# Patient Record
Sex: MALE | Race: WHITE | NOT HISPANIC OR LATINO | Employment: OTHER | URBAN - METROPOLITAN AREA
[De-identification: names, ages, dates, MRNs, and addresses within clinical notes are randomized per-mention and may not be internally consistent; named-entity substitution may affect disease eponyms.]

---

## 2024-02-14 ENCOUNTER — LAB (OUTPATIENT)
Dept: LAB | Facility: LAB | Age: 74
End: 2024-02-14
Payer: MEDICARE

## 2024-02-14 ENCOUNTER — OFFICE VISIT (OUTPATIENT)
Dept: PRIMARY CARE | Facility: CLINIC | Age: 74
End: 2024-02-14
Payer: MEDICARE

## 2024-02-14 VITALS
DIASTOLIC BLOOD PRESSURE: 82 MMHG | HEIGHT: 72 IN | HEART RATE: 81 BPM | BODY MASS INDEX: 27.74 KG/M2 | WEIGHT: 204.8 LBS | SYSTOLIC BLOOD PRESSURE: 142 MMHG | OXYGEN SATURATION: 96 %

## 2024-02-14 DIAGNOSIS — R73.9 ELEVATED BLOOD SUGAR: ICD-10-CM

## 2024-02-14 DIAGNOSIS — Z00.00 ROUTINE GENERAL MEDICAL EXAMINATION AT HEALTH CARE FACILITY: Primary | ICD-10-CM

## 2024-02-14 DIAGNOSIS — Z00.00 ROUTINE GENERAL MEDICAL EXAMINATION AT HEALTH CARE FACILITY: ICD-10-CM

## 2024-02-14 DIAGNOSIS — Z12.5 PROSTATE CANCER SCREENING: ICD-10-CM

## 2024-02-14 DIAGNOSIS — R03.0 ELEVATED BLOOD PRESSURE READING: ICD-10-CM

## 2024-02-14 DIAGNOSIS — Z80.0 FAMILY HISTORY OF COLON CANCER: ICD-10-CM

## 2024-02-14 PROBLEM — H93.11 TINNITUS OF RIGHT EAR: Status: ACTIVE | Noted: 2024-02-14

## 2024-02-14 LAB
ALBUMIN SERPL BCP-MCNC: 4.1 G/DL (ref 3.4–5)
ALP SERPL-CCNC: 79 U/L (ref 33–136)
ALT SERPL W P-5'-P-CCNC: 22 U/L (ref 10–52)
ANION GAP SERPL CALC-SCNC: 10 MMOL/L (ref 10–20)
AST SERPL W P-5'-P-CCNC: 17 U/L (ref 9–39)
BILIRUB SERPL-MCNC: 0.6 MG/DL (ref 0–1.2)
BUN SERPL-MCNC: 15 MG/DL (ref 6–23)
CALCIUM SERPL-MCNC: 9.7 MG/DL (ref 8.6–10.3)
CHLORIDE SERPL-SCNC: 103 MMOL/L (ref 98–107)
CHOLEST SERPL-MCNC: 203 MG/DL (ref 0–199)
CHOLESTEROL/HDL RATIO: 4.3
CO2 SERPL-SCNC: 28 MMOL/L (ref 21–32)
CREAT SERPL-MCNC: 1.02 MG/DL (ref 0.5–1.3)
EGFRCR SERPLBLD CKD-EPI 2021: 78 ML/MIN/1.73M*2
EST. AVERAGE GLUCOSE BLD GHB EST-MCNC: 117 MG/DL
GLUCOSE SERPL-MCNC: 100 MG/DL (ref 74–99)
HBA1C MFR BLD: 5.7 %
HDLC SERPL-MCNC: 46.7 MG/DL
LDLC SERPL CALC-MCNC: 127 MG/DL
NON HDL CHOLESTEROL: 156 MG/DL (ref 0–149)
POTASSIUM SERPL-SCNC: 4.4 MMOL/L (ref 3.5–5.3)
PROT SERPL-MCNC: 7.6 G/DL (ref 6.4–8.2)
PSA SERPL-MCNC: 2.79 NG/ML
SODIUM SERPL-SCNC: 137 MMOL/L (ref 136–145)
TRIGL SERPL-MCNC: 147 MG/DL (ref 0–149)
VLDL: 29 MG/DL (ref 0–40)

## 2024-02-14 PROCEDURE — G0103 PSA SCREENING: HCPCS

## 2024-02-14 PROCEDURE — 36415 COLL VENOUS BLD VENIPUNCTURE: CPT

## 2024-02-14 PROCEDURE — G0439 PPPS, SUBSEQ VISIT: HCPCS | Performed by: FAMILY MEDICINE

## 2024-02-14 PROCEDURE — 80053 COMPREHEN METABOLIC PANEL: CPT

## 2024-02-14 PROCEDURE — 80061 LIPID PANEL: CPT

## 2024-02-14 PROCEDURE — 1170F FXNL STATUS ASSESSED: CPT | Performed by: FAMILY MEDICINE

## 2024-02-14 PROCEDURE — 1126F AMNT PAIN NOTED NONE PRSNT: CPT | Performed by: FAMILY MEDICINE

## 2024-02-14 PROCEDURE — 1036F TOBACCO NON-USER: CPT | Performed by: FAMILY MEDICINE

## 2024-02-14 PROCEDURE — 83036 HEMOGLOBIN GLYCOSYLATED A1C: CPT

## 2024-02-14 PROCEDURE — 1159F MED LIST DOCD IN RCRD: CPT | Performed by: FAMILY MEDICINE

## 2024-02-14 PROCEDURE — 99215 OFFICE O/P EST HI 40 MIN: CPT | Performed by: FAMILY MEDICINE

## 2024-02-14 RX ORDER — ALBUTEROL SULFATE 90 UG/1
2 AEROSOL, METERED RESPIRATORY (INHALATION) EVERY 4 HOURS PRN
COMMUNITY
Start: 2014-01-20 | End: 2024-02-14 | Stop reason: ALTCHOICE

## 2024-02-14 RX ORDER — BENZONATATE 100 MG/1
100 CAPSULE ORAL EVERY 8 HOURS PRN
COMMUNITY
Start: 2014-01-20 | End: 2024-02-14 | Stop reason: ALTCHOICE

## 2024-02-14 RX ORDER — OMEGA-3-ACID ETHYL ESTERS 1 G/1
1 CAPSULE, LIQUID FILLED ORAL 2 TIMES DAILY
COMMUNITY
End: 2024-02-14 | Stop reason: WASHOUT

## 2024-02-14 RX ORDER — VIT A/VIT C/VIT E/ZINC/COPPER 4296-226
CAPSULE ORAL
COMMUNITY

## 2024-02-14 ASSESSMENT — ENCOUNTER SYMPTOMS
ARTHRALGIAS: 0
OCCASIONAL FEELINGS OF UNSTEADINESS: 0
DIZZINESS: 0
LOSS OF SENSATION IN FEET: 0
COUGH: 0
BACK PAIN: 0
BLOOD IN STOOL: 0
NERVOUS/ANXIOUS: 0
DIARRHEA: 0
DYSPHORIC MOOD: 0
CONSTIPATION: 0
DEPRESSION: 0
PALPITATIONS: 0
CHEST TIGHTNESS: 0
HEADACHES: 0
ACTIVITY CHANGE: 0
SHORTNESS OF BREATH: 0

## 2024-02-14 ASSESSMENT — ACTIVITIES OF DAILY LIVING (ADL)
DRESSING: INDEPENDENT
BATHING: INDEPENDENT
MANAGING_FINANCES: INDEPENDENT
DOING_HOUSEWORK: INDEPENDENT
GROCERY_SHOPPING: INDEPENDENT
TAKING_MEDICATION: INDEPENDENT

## 2024-02-14 ASSESSMENT — PATIENT HEALTH QUESTIONNAIRE - PHQ9
1. LITTLE INTEREST OR PLEASURE IN DOING THINGS: NOT AT ALL
2. FEELING DOWN, DEPRESSED OR HOPELESS: NOT AT ALL
SUM OF ALL RESPONSES TO PHQ9 QUESTIONS 1 AND 2: 0

## 2024-02-14 ASSESSMENT — PAIN SCALES - GENERAL: PAINLEVEL: 0-NO PAIN

## 2024-02-14 NOTE — PROGRESS NOTES
"Subjective   Reason for Visit: Kee Shelley is an 73 y.o. male here for a Medicare Wellness visit.     Past Medical, Surgical, and Family History reviewed and updated in chart.    Reviewed all medications by prescribing practitioner or clinical pharmacist (such as prescriptions, OTCs, herbal therapies and supplements) and documented in the medical record.    HPI  Presents for his wellness exam.  He states he feels very well.  He is working out regularly at the gym.  He is squatting sometimes 400 pounds.  He states he is diet rich in whole foods.  Sleep schedule is consistent but he is usually up around 8:00.    He does not check his blood pressures at home his systolics tend to run in the 130s his diastolics in the 70s.  He uses a wrist monitor for that.    Patient Care Team:  Kieran Villela MD as PCP - General     Review of Systems   Constitutional:  Negative for activity change.   HENT:  Negative for congestion.    Respiratory:  Negative for cough, chest tightness and shortness of breath.    Cardiovascular:  Negative for chest pain, palpitations and leg swelling.   Gastrointestinal:  Negative for blood in stool, constipation and diarrhea.        Last colonoscopy was with Dr. Brown, he feels about 3 to 4 years ago.   Musculoskeletal:  Negative for arthralgias and back pain.   Neurological:  Negative for dizziness and headaches.   Psychiatric/Behavioral:  Negative for dysphoric mood. The patient is not nervous/anxious.        Objective   Vitals:  /82   Pulse 81   Ht 1.816 m (5' 11.5\")   Wt 92.9 kg (204 lb 12.8 oz)   SpO2 96%   BMI 28.17 kg/m²       Physical Exam  Vitals reviewed.   Constitutional:       Appearance: Normal appearance.   HENT:      Right Ear: Tympanic membrane, ear canal and external ear normal.      Left Ear: Tympanic membrane, ear canal and external ear normal.      Nose: Nose normal.      Mouth/Throat:      Mouth: Mucous membranes are moist.      Pharynx: Oropharynx is clear. "   Eyes:      Conjunctiva/sclera: Conjunctivae normal.   Neck:      Thyroid: No thyromegaly or thyroid tenderness.      Vascular: No carotid bruit.   Cardiovascular:      Rate and Rhythm: Normal rate and regular rhythm.      Heart sounds: No murmur heard.  Pulmonary:      Effort: Pulmonary effort is normal.      Breath sounds: Normal breath sounds.   Abdominal:      General: Abdomen is flat.      Palpations: Abdomen is soft. There is no mass.      Tenderness: There is no abdominal tenderness.   Musculoskeletal:      Cervical back: Neck supple.      Right lower leg: No edema.      Left lower leg: No edema.   Lymphadenopathy:      Cervical: No cervical adenopathy.   Skin:     General: Skin is warm and dry.   Neurological:      Mental Status: He is alert.   Psychiatric:         Mood and Affect: Mood normal.         Assessment/Plan   Problem List Items Addressed This Visit    None  Visit Diagnoses       Routine general medical examination at health care facility    -  Primary    Relevant Orders    Comprehensive Metabolic Panel    Lipid Panel    Family history of colon cancer        Prostate cancer screening        Relevant Orders    Prostate Specific Antigen    Elevated blood sugar        Relevant Orders    Hemoglobin A1C    Elevated blood pressure reading            He will continue to follow his blood pressures at home and let me know if they are consistently above 140/90.  He will continue his regular workout routine.  Continue with diet rich in whole foods.  Will check his above lab tests.  He will check with Dr. Brown to see when his next colonoscopy is due.  He should be every 5 years since he does have a family history of colon cancer in his sister.

## 2024-02-14 NOTE — PATIENT INSTRUCTIONS
Doing well.  Check your blood test fasting.  Continue you regular workouts.   Continue to follow your resting BP at home and let me know if consistently above 140/90.   Check with Dr. Brown to see when you're due for the colonoscopy.

## 2024-09-27 ENCOUNTER — OFFICE VISIT (OUTPATIENT)
Dept: PRIMARY CARE | Facility: CLINIC | Age: 74
End: 2024-09-27
Payer: MEDICARE

## 2024-09-27 VITALS
BODY MASS INDEX: 27.84 KG/M2 | WEIGHT: 202.4 LBS | HEART RATE: 68 BPM | SYSTOLIC BLOOD PRESSURE: 142 MMHG | DIASTOLIC BLOOD PRESSURE: 82 MMHG

## 2024-09-27 DIAGNOSIS — K14.8 TONGUE LESION: Primary | ICD-10-CM

## 2024-09-27 DIAGNOSIS — R03.0 ELEVATED BLOOD PRESSURE READING: ICD-10-CM

## 2024-09-27 DIAGNOSIS — Z86.010 HISTORY OF COLONIC POLYPS: ICD-10-CM

## 2024-09-27 PROCEDURE — 1036F TOBACCO NON-USER: CPT | Performed by: FAMILY MEDICINE

## 2024-09-27 PROCEDURE — 1159F MED LIST DOCD IN RCRD: CPT | Performed by: FAMILY MEDICINE

## 2024-09-27 PROCEDURE — 99214 OFFICE O/P EST MOD 30 MIN: CPT | Performed by: FAMILY MEDICINE

## 2024-09-27 PROCEDURE — 1126F AMNT PAIN NOTED NONE PRSNT: CPT | Performed by: FAMILY MEDICINE

## 2024-09-27 PROCEDURE — 1124F ACP DISCUSS-NO DSCNMKR DOCD: CPT | Performed by: FAMILY MEDICINE

## 2024-09-27 ASSESSMENT — PAIN SCALES - GENERAL: PAINLEVEL: 0-NO PAIN

## 2024-09-27 NOTE — PROGRESS NOTES
Subjective   Patient ID: Kee Shelley is a 74 y.o. male who presents for Side of Tongue lump and Tinnitus.    HPI   He presents today because he has a lump/lesion on the side of his tongue.  States is been there for about 6 months.  Has had a pain on that really.  Sometimes when he brushes his teeth it can bleed.  He is very concerned about this.      States his blood pressures at home have been around 129/73.  He takes that regularly.    Does have  history of colon polyps and thinks he is due for colonoscopy with Dr. Brown.    Review of Systems    Objective   /82   Pulse 68   Wt 91.8 kg (202 lb 6.4 oz)   BMI 27.84 kg/m²     Physical Exam  Constitutional:       Appearance: Normal appearance. He is not ill-appearing.   HENT:      Mouth/Throat:      Comments: The left side of his tongue he has an approximately half to 1 cm indurated round area, color is the same as the tongue.  Pulmonary:      Effort: Pulmonary effort is normal.   Neurological:      Mental Status: He is alert.         Assessment/Plan   Diagnoses and all orders for this visit:  Tongue lesion  -     Referral to ENT; Future  History of colonic polyps  -     Referral to Gastroenterology; Future  Elevated blood pressure reading  Blood pressure is up a little today but he certainly worried about this tongue lesion.  He has been normal at home.  He will call if it is consistently above 140/90.      I have referred him over to Dr. Mcqueen, whom he has seen before.  He has any trouble getting into him in the next week or 2, he will let me know.  I like him to be seen a soon as possible since he has so much anxiety over this.    I put in a referral for the colonoscopy.

## 2024-10-29 ENCOUNTER — APPOINTMENT (OUTPATIENT)
Dept: OTOLARYNGOLOGY | Facility: CLINIC | Age: 74
End: 2024-10-29
Payer: COMMERCIAL

## 2024-10-29 VITALS — TEMPERATURE: 97.1 F | WEIGHT: 202 LBS | BODY MASS INDEX: 27.36 KG/M2 | HEIGHT: 72 IN

## 2024-10-29 DIAGNOSIS — K14.8 TONGUE LESION: Primary | ICD-10-CM

## 2024-10-29 DIAGNOSIS — D36.9 PAPILLOMA: ICD-10-CM

## 2024-10-29 PROCEDURE — 88305 TISSUE EXAM BY PATHOLOGIST: CPT | Performed by: PATHOLOGY

## 2024-10-29 PROCEDURE — 1036F TOBACCO NON-USER: CPT | Performed by: OTOLARYNGOLOGY

## 2024-10-29 PROCEDURE — 88305 TISSUE EXAM BY PATHOLOGIST: CPT

## 2024-10-29 PROCEDURE — 99213 OFFICE O/P EST LOW 20 MIN: CPT | Performed by: OTOLARYNGOLOGY

## 2024-10-29 PROCEDURE — 3008F BODY MASS INDEX DOCD: CPT | Performed by: OTOLARYNGOLOGY

## 2024-10-29 PROCEDURE — 1159F MED LIST DOCD IN RCRD: CPT | Performed by: OTOLARYNGOLOGY

## 2024-10-29 PROCEDURE — 41100 BIOPSY OF TONGUE: CPT | Performed by: OTOLARYNGOLOGY

## 2024-10-29 PROCEDURE — 1160F RVW MEDS BY RX/DR IN RCRD: CPT | Performed by: OTOLARYNGOLOGY

## 2024-11-01 ENCOUNTER — TELEPHONE (OUTPATIENT)
Dept: OTOLARYNGOLOGY | Facility: CLINIC | Age: 74
End: 2024-11-01
Payer: COMMERCIAL

## 2024-11-01 LAB
LABORATORY COMMENT REPORT: NORMAL
PATH REPORT.FINAL DX SPEC: NORMAL
PATH REPORT.GROSS SPEC: NORMAL
PATH REPORT.RELEVANT HX SPEC: NORMAL
PATH REPORT.TOTAL CANCER: NORMAL

## 2024-11-12 ENCOUNTER — APPOINTMENT (OUTPATIENT)
Dept: OTOLARYNGOLOGY | Facility: CLINIC | Age: 74
End: 2024-11-12
Payer: MEDICARE

## 2024-11-12 VITALS — TEMPERATURE: 97.3 F | BODY MASS INDEX: 28.31 KG/M2 | HEIGHT: 72 IN | WEIGHT: 209 LBS

## 2024-11-12 DIAGNOSIS — C02.3 MALIGNANT NEOPLASM OF ANTERIOR TWO-THIRDS OF TONGUE (MULTI): Primary | ICD-10-CM

## 2024-11-12 PROCEDURE — 1036F TOBACCO NON-USER: CPT | Performed by: OTOLARYNGOLOGY

## 2024-11-12 PROCEDURE — 1159F MED LIST DOCD IN RCRD: CPT | Performed by: OTOLARYNGOLOGY

## 2024-11-12 PROCEDURE — 1160F RVW MEDS BY RX/DR IN RCRD: CPT | Performed by: OTOLARYNGOLOGY

## 2024-11-12 PROCEDURE — 99213 OFFICE O/P EST LOW 20 MIN: CPT | Performed by: OTOLARYNGOLOGY

## 2024-11-12 PROCEDURE — 3008F BODY MASS INDEX DOCD: CPT | Performed by: OTOLARYNGOLOGY

## 2024-11-12 NOTE — PROGRESS NOTES
"HPI  Kee Shelley is a 74 y.o. male follow-up pathology left tongue.  Biopsy consistent with invasive squamous cell carcinoma.  No new symptoms    History reviewed. No pertinent past medical history.         Medications:     Current Outpatient Medications:     multivit-min/iron/folic acid/K (ADULTS MULTIVITAMIN ORAL), as directed Orally, Disp: , Rfl:     vitamins A,C,E-zinc-copper (PreserVision AREDS) 4,296 mcg-226 mg-90 mg capsule, , Disp: , Rfl:      Allergies:  Allergies   Allergen Reactions    Tetanus And Diphther. Tox (Pf) Rash        Physical Exam:  Last Recorded Vitals  Temperature 36.3 °C (97.3 °F), height 1.816 m (5' 11.5\"), weight 94.8 kg (209 lb).  General:     General appearance: Well-developed, well-nourished in no acute distress.       Voice:  normal       Head/face: Normal appearance; nontender to palpation     Facial nerve function: Normal and symmetric bilaterally.    Oral/oropharynx:     Oral vestibule: Normal labial and gingival mucosa     Tongue/floor of mouth: Normal right.  Left lateral tongue actually looks better than previous.  Healing.  Still with a small ulcer      Oropharynx: Clear.  No lesions present of the hard/soft palate, posterior pharynx    Neck:     Neck: Normal appearance, trachea midline     Salivary glands: Normal to palpation bilaterally     Lymph nodes: No cervical lymphadenopathy to palpation     Thyroid: No thyromegaly.  No palpable nodules     Range of motion: Normal    Neurological:     Cortical functions: Alert and oriented x3, appropriate affect       Larynx/hypopharynx:     Laryngeal findings: Mirror exam inadequate or limited secondary to enlarged base of tongue and/or excessive gagging    Ear:     Ear canal: Normal bilaterally     Tympanic membrane: Intact and mobile bilaterally     Pinna: Normal bilaterally     Hearing:  Gross hearing assessment normal by voice    Nose:     Visualized using: Anterior rhinoscopy     Nasopharynx: Inadequate mirror exam secondary to " gag, anatomy.       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  We discussed the pathology results and I have referred him to my head neck surgical colleagues to discuss treatment with him.  I will see him back at any point        Colby Mcqueen MD

## 2024-11-25 ENCOUNTER — OFFICE VISIT (OUTPATIENT)
Dept: OTOLARYNGOLOGY | Facility: CLINIC | Age: 74
End: 2024-11-25
Payer: MEDICARE

## 2024-11-25 VITALS
BODY MASS INDEX: 27.8 KG/M2 | DIASTOLIC BLOOD PRESSURE: 94 MMHG | RESPIRATION RATE: 16 BRPM | WEIGHT: 205.25 LBS | TEMPERATURE: 98.3 F | HEIGHT: 72 IN | OXYGEN SATURATION: 96 % | HEART RATE: 79 BPM | SYSTOLIC BLOOD PRESSURE: 149 MMHG

## 2024-11-25 DIAGNOSIS — K14.8 TONGUE LESION: ICD-10-CM

## 2024-11-25 DIAGNOSIS — C02.9 PRIMARY TONGUE SQUAMOUS CELL CARCINOMA (MULTI): Primary | ICD-10-CM

## 2024-11-25 PROCEDURE — 99214 OFFICE O/P EST MOD 30 MIN: CPT | Performed by: OTOLARYNGOLOGY

## 2024-11-25 PROCEDURE — 1126F AMNT PAIN NOTED NONE PRSNT: CPT | Performed by: OTOLARYNGOLOGY

## 2024-11-25 PROCEDURE — 1159F MED LIST DOCD IN RCRD: CPT | Performed by: OTOLARYNGOLOGY

## 2024-11-25 PROCEDURE — 99214 OFFICE O/P EST MOD 30 MIN: CPT | Mod: GC | Performed by: OTOLARYNGOLOGY

## 2024-11-25 PROCEDURE — 3008F BODY MASS INDEX DOCD: CPT | Performed by: OTOLARYNGOLOGY

## 2024-11-25 PROCEDURE — 1160F RVW MEDS BY RX/DR IN RCRD: CPT | Performed by: OTOLARYNGOLOGY

## 2024-11-25 PROCEDURE — 1036F TOBACCO NON-USER: CPT | Performed by: OTOLARYNGOLOGY

## 2024-11-25 SDOH — ECONOMIC STABILITY: FOOD INSECURITY: WITHIN THE PAST 12 MONTHS, THE FOOD YOU BOUGHT JUST DIDN'T LAST AND YOU DIDN'T HAVE MONEY TO GET MORE.: NEVER TRUE

## 2024-11-25 SDOH — ECONOMIC STABILITY: FOOD INSECURITY: WITHIN THE PAST 12 MONTHS, YOU WORRIED THAT YOUR FOOD WOULD RUN OUT BEFORE YOU GOT MONEY TO BUY MORE.: NEVER TRUE

## 2024-11-25 ASSESSMENT — PATIENT HEALTH QUESTIONNAIRE - PHQ9
SUM OF ALL RESPONSES TO PHQ9 QUESTIONS 1 AND 2: 0
1. LITTLE INTEREST OR PLEASURE IN DOING THINGS: NOT AT ALL
2. FEELING DOWN, DEPRESSED OR HOPELESS: NOT AT ALL

## 2024-11-25 ASSESSMENT — LIFESTYLE VARIABLES
HOW MANY STANDARD DRINKS CONTAINING ALCOHOL DO YOU HAVE ON A TYPICAL DAY: 1 OR 2
HOW OFTEN DO YOU HAVE SIX OR MORE DRINKS ON ONE OCCASION: NEVER
SKIP TO QUESTIONS 9-10: 1
AUDIT-C TOTAL SCORE: 1
HOW OFTEN DO YOU HAVE A DRINK CONTAINING ALCOHOL: MONTHLY OR LESS

## 2024-11-25 ASSESSMENT — PAIN SCALES - GENERAL: PAINLEVEL_OUTOF10: 0-NO PAIN

## 2024-11-25 ASSESSMENT — ENCOUNTER SYMPTOMS
DEPRESSION: 0
LOSS OF SENSATION IN FEET: 0
OCCASIONAL FEELINGS OF UNSTEADINESS: 0

## 2024-11-25 ASSESSMENT — COLUMBIA-SUICIDE SEVERITY RATING SCALE - C-SSRS
6. HAVE YOU EVER DONE ANYTHING, STARTED TO DO ANYTHING, OR PREPARED TO DO ANYTHING TO END YOUR LIFE?: NO
1. IN THE PAST MONTH, HAVE YOU WISHED YOU WERE DEAD OR WISHED YOU COULD GO TO SLEEP AND NOT WAKE UP?: NO
2. HAVE YOU ACTUALLY HAD ANY THOUGHTS OF KILLING YOURSELF?: NO

## 2024-11-25 NOTE — PROGRESS NOTES
Otolaryngology - Head and Neck Cancer Outpatient Follow-up Note    Chief Concern:  Follow-up for Tongue lesion     History Of Present Illness  Kee Shelley is a 74 y.o. male with a history of  a left posterior-lateral tongue SCC referred by Dr. Mcqueen, presenting today for further evaluation.  Patient reports that they had left-sided tongue pain about 2 and half months ago.  This was persistent and progressive and so they presented to PCP for further evaluation.  Their PCP noticed a lesion on the lateral aspect the tongue and sent them to Dr. Mcqueen for further evaluation.  Dr. Mcqueen elevated lesion in obtain a biopsy which resulted with schedule carcinoma.  He is not have any significant difficulty with p.o. intake, breathing, bleeding, denies any new neck masses.  He has a remote history of smoking 50 years ago, for half a pack a day for 4 to 5 years.  He is an occasional drinker.  He has a family history of colorectal cancer in his sister.  No other family history of head neck malignancies.  He is retired.  He is pretty active and works out 4 to 5 days a week with cardio and weightlifting.  He is right-handed.     Previous Head and Neck Oncologic History:  Diagnosis:  Tongue lesion  Initial Staging: n/a   Treatment History:   10/29/24 - Tongue lesion bx: +SCC        Past Medical History  He has no past medical history on file.  Patient Active Problem List   Diagnosis    Tinnitus of right ear       Surgical History  He has a past surgical history that includes Carpal tunnel release.     Social History  He reports that he has quit smoking. His smoking use included cigarettes. He has never used smokeless tobacco. He reports current alcohol use. He reports that he does not use drugs.    Family History  No family history on file.     Allergies  Patient has no known allergies.    Review of Systems  A 12-point review of systems was performed and noted be negative except for that which was mentioned in the history of  present illness     Last Recorded Vitals  Blood pressure (!) 149/94, pulse 79, temperature 36.8 °C (98.3 °F), temperature source Temporal, resp. rate 16, height 1.829 m (6'), weight 93.1 kg (205 lb 4 oz), SpO2 96%.     Physical Exam:  Constitutional:  No acute distress  Voice:  No hoarseness or other abnormality  Respiration:  Breathing comfortably, no stridor  Cardiovascular:  No clubbing/cyanosis/edema in hands  Eyes:  EOM intact, sclera normal  Neuro:  Alert and oriented times 3, Cranial nerves II-XII grossly intact and symmetric bilaterally  Head and Face:  Symmetric facial features, no masses or lesions, sinuses non-tender to palpation  Salivary Glands:  Parotid and submandibular glands normal bilaterally  Right Ear:  Normal external ear, external auditory canal, and TM to otoscopy, normal hearing to whispered voice.  Left Ear: Normal external ear, external auditory canal, and TM to otoscopy, normal hearing to whispered voice.  Nose:  External nose midline, anterior rhinoscopy is normal with limited visualization to the anterior aspect of the interior turbinates, no bleeding or drainage, no lesions  Oral Cavity/Oropharynx/Lips: Left lateral tongue with 1 cm diameter firm, raised lesion with central scarring from previous biopsy, does not feel super deep on palpation, there are areas of leukoplakia appreciated extending approximately 1 cm anterior and inferior to the primary lesion, otherwise normal mucous membranes, normal floor of mouth/tongue/OP, no masses or lesions  Pharynx: no masses or lesions  Neck/Lymph:  No LAD, no thyroid masses, trachea midline  Skin:  Neck skin is without scar or injury  Psych:  Alert and oriented with appropriate mood and affect      Medications:  Medication Documentation Review Audit       Reviewed by Shivam Bauman MD (Physician) on 11/25/24 at 1422      Medication Order Taking? Sig Documenting Provider Last Dose Status   multivit-min/iron/folic acid/K (ADULTS MULTIVITAMIN ORAL)  [id number] Yes as directed Orally Historical Provider, MD Taking Active   vitamins A,C,E-zinc-copper (PreserVision AREDS) 4,296 mcg-226 mg-90 mg capsule [id number] Yes  Historical Provider, MD Taking Active                      Imaging:  None relevant to the current chief concern available       Assessment/Plan   74-year-old male with a new left-sided lateral tongue skin cell carcinoma that is approximately 1 cm in size with surrounding leukoplakia.  Tentatively clinical staging of T1 N0 M0.  Will obtain imaging of the neck and chest to complete clinical staging.  Discussed options for treatment and recommendation for surgical excision at this time.  Discussed direct laryngoscopy and left partial glossectomy.  Discussed the need for potential elective neck dissection in the future should the final pathology be greater than 3 to 4 mm versus observation at that time pending the depth of the lesion on final pathology.  Discussed the risks of surgery including bleeding, infection, recurrence, postoperative pain.  Patient expressed understanding of the nature of disease as well as the risks and would like to move forward with surgical resection.    -Obtain CT neck  -Obtain CT chest  - Plan for direct laryngoscopy with Left partial glossectomy      Key Nagy MD  [id number]  Otolaryngology - Head and Neck Surgery

## 2024-11-26 ENCOUNTER — LAB (OUTPATIENT)
Dept: LAB | Facility: LAB | Age: 74
End: 2024-11-26
Payer: MEDICARE

## 2024-11-26 DIAGNOSIS — C02.9 PRIMARY TONGUE SQUAMOUS CELL CARCINOMA (MULTI): ICD-10-CM

## 2024-11-26 LAB
BUN SERPL-MCNC: 14 MG/DL (ref 6–23)
CREAT SERPL-MCNC: 0.84 MG/DL (ref 0.5–1.3)
EGFRCR SERPLBLD CKD-EPI 2021: >90 ML/MIN/1.73M*2

## 2024-11-26 PROCEDURE — 36415 COLL VENOUS BLD VENIPUNCTURE: CPT

## 2024-12-03 ENCOUNTER — APPOINTMENT (OUTPATIENT)
Dept: PREADMISSION TESTING | Facility: HOSPITAL | Age: 74
End: 2024-12-03
Payer: COMMERCIAL

## 2024-12-05 ENCOUNTER — APPOINTMENT (OUTPATIENT)
Dept: RADIOLOGY | Facility: HOSPITAL | Age: 74
End: 2024-12-05
Payer: COMMERCIAL

## 2024-12-05 ENCOUNTER — HOSPITAL ENCOUNTER (OUTPATIENT)
Dept: RADIOLOGY | Facility: HOSPITAL | Age: 74
Discharge: HOME | End: 2024-12-05
Payer: MEDICARE

## 2024-12-05 DIAGNOSIS — C02.9 PRIMARY TONGUE SQUAMOUS CELL CARCINOMA (MULTI): ICD-10-CM

## 2024-12-05 PROCEDURE — 2550000001 HC RX 255 CONTRASTS: Performed by: OTOLARYNGOLOGY

## 2024-12-05 PROCEDURE — 70491 CT SOFT TISSUE NECK W/DYE: CPT

## 2024-12-10 ENCOUNTER — APPOINTMENT (OUTPATIENT)
Dept: PREADMISSION TESTING | Facility: HOSPITAL | Age: 74
End: 2024-12-10
Payer: COMMERCIAL

## 2024-12-11 ENCOUNTER — LAB (OUTPATIENT)
Dept: LAB | Facility: LAB | Age: 74
End: 2024-12-11
Payer: COMMERCIAL

## 2024-12-11 ENCOUNTER — PRE-ADMISSION TESTING (OUTPATIENT)
Dept: PREADMISSION TESTING | Facility: HOSPITAL | Age: 74
End: 2024-12-11
Payer: MEDICARE

## 2024-12-11 ENCOUNTER — ANESTHESIA EVENT (OUTPATIENT)
Dept: OPERATING ROOM | Facility: HOSPITAL | Age: 74
End: 2024-12-11
Payer: MEDICARE

## 2024-12-11 VITALS
OXYGEN SATURATION: 97 % | TEMPERATURE: 98.6 F | HEIGHT: 73 IN | WEIGHT: 207.3 LBS | DIASTOLIC BLOOD PRESSURE: 86 MMHG | BODY MASS INDEX: 27.47 KG/M2 | SYSTOLIC BLOOD PRESSURE: 145 MMHG | HEART RATE: 88 BPM

## 2024-12-11 DIAGNOSIS — Z01.818 PREOP TESTING: Primary | ICD-10-CM

## 2024-12-11 DIAGNOSIS — Z01.818 PREOP TESTING: ICD-10-CM

## 2024-12-11 LAB
ANION GAP SERPL CALCULATED.3IONS-SCNC: 12 MMOL/L (ref 10–20)
BASOPHILS # BLD AUTO: 0.05 X10*3/UL (ref 0–0.1)
BASOPHILS NFR BLD AUTO: 0.6 %
BUN SERPL-MCNC: 14 MG/DL (ref 6–23)
CALCIUM SERPL-MCNC: 9.4 MG/DL (ref 8.6–10.3)
CHLORIDE SERPL-SCNC: 105 MMOL/L (ref 98–107)
CO2 SERPL-SCNC: 27 MMOL/L (ref 21–32)
CREAT SERPL-MCNC: 0.85 MG/DL (ref 0.5–1.3)
EGFRCR SERPLBLD CKD-EPI 2021: >90 ML/MIN/1.73M*2
EOSINOPHIL # BLD AUTO: 0.11 X10*3/UL (ref 0–0.4)
EOSINOPHIL NFR BLD AUTO: 1.3 %
ERYTHROCYTE [DISTWIDTH] IN BLOOD BY AUTOMATED COUNT: 13.8 % (ref 11.5–14.5)
GLUCOSE SERPL-MCNC: 81 MG/DL (ref 74–99)
HCT VFR BLD AUTO: 49.2 % (ref 41–52)
HGB BLD-MCNC: 16.4 G/DL (ref 13.5–17.5)
IMM GRANULOCYTES # BLD AUTO: 0.09 X10*3/UL (ref 0–0.5)
IMM GRANULOCYTES NFR BLD AUTO: 1.1 % (ref 0–0.9)
LYMPHOCYTES # BLD AUTO: 1.96 X10*3/UL (ref 0.8–3)
LYMPHOCYTES NFR BLD AUTO: 23.2 %
MCH RBC QN AUTO: 29.8 PG (ref 26–34)
MCHC RBC AUTO-ENTMCNC: 33.3 G/DL (ref 32–36)
MCV RBC AUTO: 90 FL (ref 80–100)
MONOCYTES # BLD AUTO: 0.72 X10*3/UL (ref 0.05–0.8)
MONOCYTES NFR BLD AUTO: 8.5 %
NEUTROPHILS # BLD AUTO: 5.52 X10*3/UL (ref 1.6–5.5)
NEUTROPHILS NFR BLD AUTO: 65.3 %
NRBC BLD-RTO: 0 /100 WBCS (ref 0–0)
PLATELET # BLD AUTO: 234 X10*3/UL (ref 150–450)
POTASSIUM SERPL-SCNC: 4.1 MMOL/L (ref 3.5–5.3)
RBC # BLD AUTO: 5.5 X10*6/UL (ref 4.5–5.9)
SODIUM SERPL-SCNC: 140 MMOL/L (ref 136–145)
WBC # BLD AUTO: 8.5 X10*3/UL (ref 4.4–11.3)

## 2024-12-11 PROCEDURE — 80048 BASIC METABOLIC PNL TOTAL CA: CPT

## 2024-12-11 PROCEDURE — 85025 COMPLETE CBC W/AUTO DIFF WBC: CPT

## 2024-12-11 RX ORDER — MAGNESIUM 250 MG
TABLET ORAL DAILY
COMMUNITY

## 2024-12-11 RX ORDER — PSEUDOEPHEDRINE HCL 30 MG
TABLET ORAL DAILY
COMMUNITY

## 2024-12-11 RX ORDER — ACETAMINOPHEN 500 MG
1000 TABLET ORAL EVERY 6 HOURS PRN
COMMUNITY
End: 2024-12-12 | Stop reason: HOSPADM

## 2024-12-11 ASSESSMENT — DUKE ACTIVITY SCORE INDEX (DASI)
CAN YOU PARTICIPATE IN STRENOUS SPORTS LIKE SWIMMING, SINGLES TENNIS, FOOTBALL, BASKETBALL, OR SKIING: YES
CAN YOU CLIMB A FLIGHT OF STAIRS OR WALK UP A HILL: YES
CAN YOU WALK A BLOCK OR TWO ON LEVEL GROUND: YES
CAN YOU TAKE CARE OF YOURSELF (EAT, DRESS, BATHE, OR USE TOILET): YES
CAN YOU RUN A SHORT DISTANCE: YES
DASI METS SCORE: 9.9
CAN YOU PARTICIPATE IN MODERATE RECREATIONAL ACTIVITIES LIKE GOLF, BOWLING, DANCING, DOUBLES TENNIS OR THROWING A BASEBALL OR FOOTBALL: YES
CAN YOU DO MODERATE WORK AROUND THE HOUSE LIKE VACUUMING, SWEEPING FLOORS OR CARRYING GROCERIES: YES
CAN YOU HAVE SEXUAL RELATIONS: YES
CAN YOU WALK INDOORS, SUCH AS AROUND YOUR HOUSE: YES
CAN YOU DO LIGHT WORK AROUND THE HOUSE LIKE DUSTING OR WASHING DISHES: YES
CAN YOU DO YARD WORK LIKE RAKING LEAVES, WEEDING OR PUSHING A MOWER: YES
TOTAL_SCORE: 58.2
CAN YOU DO HEAVY WORK AROUND THE HOUSE LIKE SCRUBBING FLOORS OR LIFTING AND MOVING HEAVY FURNITURE: YES

## 2024-12-11 ASSESSMENT — ENCOUNTER SYMPTOMS
CARDIOVASCULAR NEGATIVE: 1
MUSCULOSKELETAL NEGATIVE: 1
GASTROINTESTINAL NEGATIVE: 1
PSYCHIATRIC NEGATIVE: 1
ENDOCRINE NEGATIVE: 1
EYES NEGATIVE: 1
NEUROLOGICAL NEGATIVE: 1
RESPIRATORY NEGATIVE: 1
HEMATOLOGIC/LYMPHATIC NEGATIVE: 1
CONSTITUTIONAL NEGATIVE: 1

## 2024-12-11 ASSESSMENT — PAIN - FUNCTIONAL ASSESSMENT: PAIN_FUNCTIONAL_ASSESSMENT: 0-10

## 2024-12-11 ASSESSMENT — PAIN SCALES - GENERAL: PAINLEVEL_OUTOF10: 0 - NO PAIN

## 2024-12-11 NOTE — PREPROCEDURE INSTRUCTIONS
Medication List            Accurate as of December 11, 2024  3:13 PM. Always use your most recent med list.                acetaminophen 500 mg tablet  Commonly known as: Tylenol  Medication Adjustments for Surgery: Take/Use as prescribed     ADULTS MULTIVITAMIN ORAL  Notes to patient: Stop until after surgery     calcium citrate 250 mg calcium tablet  Notes to patient: Stop until after surgery     magnesium 250 mg tablet  Notes to patient: Stop until after surgery     PreserVision AREDS 4,296 mcg-226 mg-90 mg capsule  Generic drug: vitamins A,C,E-zinc-copper  Notes to patient: Stop until after surgery     ZINC ACETATE ORAL  Notes to patient: Stop until after surgery                Preoperative Fasting Guidelines    Why must I stop eating and drinking near surgery time?  With sedation, food or liquid in your stomach can enter your lungs causing serious complications  Increases nausea and vomiting    When do I need to stop eating and drinking before my surgery?  Do not eat any food after midnight the night before your surgery/procedure.  You may have up to 13.5 ounces of clear liquid until TWO hours before your instructed arrival time to the hospital.  This includes water, black tea/coffee, (no milk or cream) apple juice, and electrolyte drinks (Gatorade)  You may chew gum until TWO hours before your surgery/procedure      PAT DISCHARGE INSTRUCTIONS    Please call the Same Day Surgery (SDS) Department of the hospital where your procedure will be performed after 2:00 PM the day before your surgery. If you are scheduled on a Monday, or a Tuesday following a Monday holiday, you will need to call on the last business day prior to your surgery.    [facility]  [address] [place] [address] [phone]  [address], [address] [phone]  [facility]  [place]  [address] [address] [place] [address]  [phone]    Please let your surgeon know if:      You develop any open sores, shingles, burning or painful urination as these may increase your risk of an infection.   You no longer wish to have the surgery.   Any other personal circumstances change that may lead to the need to cancel or defer this surgery-such as being sick or getting admitted to any hospital within one week of your planned procedure.    Your contact details change, such as a change of address or phone number.    Starting now:     Please DO NOT drink alcohol or smoke for 24 hours before surgery. It is well known that quitting smoking can make a huge difference to your health and recovery from surgery. The longer you abstain from smoking, the better your chances of a healthy recovery. If you need help with quitting, call 9-800QUIT-NOW to be connected to a trained counselor who will discuss the best methods to help you quit.     Before your surgery:    Please stop all supplements 7 days prior to surgery. Or as directed by your surgeon.   Please stop taking NSAID pain medicine such as Advil and Motrin 7 days before surgery.    If you develop any fever, cough, cold, rashes, cuts, scratches, scrapes, urinary symptoms or infection anywhere on your body (including teeth and gums) prior to surgery, please call your surgeon’s office as soon as possible. This may require treatment to reduce the chance of cancellation on the day of surgery.    The day before your surgery:   DIET- Please follow the diet instructions at the top of your packet.   Get a good night’s rest.  Use the special soap for bathing if you have been instructed to use one.    Scheduled surgery times may change and you will be notified if this occurs - please check your personal voicemail for any updates.     On the morning of surgery:   Wear comfortable, loose fitting clothes which open in the front. Please do not wear moisturizers,  creams, lotions, makeup or perfume.    Please bring with you to surgery:   Photo ID and insurance card   Current list of medicines and allergies   Pacemaker/ Defibrillator/Heart stent cards   CPAP machine and mask    Slings/ splints/ crutches   A copy of your complete advanced directive/DHPOA.    Please do NOT bring with you to surgery:   All jewelry and valuables should be left at home.   Prosthetic devices such as contact lenses, hearing aids, dentures, eyelash extensions, hairpins and body piercings must be removed prior to going in to the surgical suite.    After outpatient surgery:   A responsible adult MUST accompany you at the time of discharge and stay with you for 24 hours after your surgery. You may NOT drive yourself home after surgery.    Do not drive, operate machinery, make critical decisions or do activities that require co-ordination or balance until after a night’s sleep.   Do not drink alcoholic beverages for 24 hours.   Instructions for resuming your medications will be provided by your surgeon.    CALL YOUR DOCTOR AFTER SURGERY IF YOU HAVE:     Chills and/or a fever of 101° F or higher.    Redness, swelling, pus or drainage from your surgical wound or a bad smell from the wound.    Lightheadedness, fainting or confusion.    Persistent vomiting (throwing up) and are not able to eat or drink for 12 hours.    Three or more loose, watery bowel movements in 24 hours (diarrhea).   Difficulty or pain while urinating( after non-urological surgery)    Pain and swelling in your legs, especially if it is only on one side.    Difficulty breathing or are breathing faster than normal.    Any new concerning symptoms.

## 2024-12-11 NOTE — PROGRESS NOTES
Pharmacy Medication History Review    Kee Shelley is a 74 y.o. male who is planned to be admitted for Primary tongue squamous cell carcinoma (Multi). Pharmacy called the patient prior to their scheduled procedure and reviewed the patient's [id number] medications for accuracy.    Medications ADDED:  none  Medications CHANGED:  none  Medications REMOVED:   none    Please review updated prior to admission medication list and comments regarding how patient may be taking medications differently by going to Admission tab --> Admission Orders --> Admit Orders / Review prior to admission medications.     Preferred pharmacy, last doses of medications, and allergies to be confirmed with patient by nursing the day of procedure.     Sources used to complete the med history include:  [facility]  Pharmacy dispense history  Patient interview  Chart Review  Care Everywhere     Below are additional concerns with the patient's PTA list.  none    Francy Lucas   [facility]  Please reach out via Secure Chat for questions

## 2024-12-11 NOTE — H&P (VIEW-ONLY)
CPM/PAT Evaluation       Name: Kee Shelley (Kee Shelley)  /Age: 1950/ y.o.     In-Person       Chief Complaint: tongue lesion    HPI    Pt is  a 74 year old male with a left sided tongue lesion. Pt reports he first noticed the lesion on his tongue about three months ago. Pt stated at times the left side of his tongue feels sore.  Pt also report occasional bleeding when he brushes his toothbrush over the lesion.  Pt saw an ENT physician who performed a tongue biopsy. The biopsy showed:  Invasive keratinizing squamous cell carcinoma, moderately differentiated. Pt was given a referral to a surgeon.  Pt as completed imaging and has been  scheduled for direct laryngoscopy and left partial glossectomy. Pt denies dysphagia, CP, SOB, or dizziness.     Past Medical History:   Diagnosis Date    Anxiety     Tinnitus of right ear      Past Surgical History:   Procedure Laterality Date    CARPAL TUNNEL RELEASE Left     CATARACT EXTRACTION Bilateral     COLONOSCOPY      OTHER SURGICAL HISTORY Bilateral     bilateral  retinal tear repair     Social History     Tobacco Use    Smoking status: Former     Current packs/day: 0.00     Types: Cigarettes     Quit date: 1974     Years since quittin.0    Smokeless tobacco: Never   Substance Use Topics    Alcohol use: Yes     Comment: about once a month     Social History     Substance and Sexual Activity   Drug Use Never       No Known Allergies    Current Outpatient Medications   Medication Sig Dispense Refill    calcium citrate 250 mg calcium tablet Take by mouth once daily.      magnesium 250 mg tablet Take by mouth once daily.      multivit-min/iron/folic acid/K (ADULTS MULTIVITAMIN ORAL) Take 1 tablet by mouth once daily.      vitamins A,C,E-zinc-copper (PreserVision AREDS) 4,296 mcg-226 mg-90 mg capsule Take 1 capsule by mouth once daily.      ZINC ACETATE ORAL Use in the mouth or throat once daily.      acetaminophen (Tylenol) 500 mg tablet Take 2 tablets  (1,000 mg) by mouth every 6 hours if needed for mild pain (1 - 3).       No current facility-administered medications for this visit.   Review of Systems   Constitutional: Negative.    HENT:          Left sided tongue lesion that occasionally bleeds.   Eyes: Negative.    Respiratory: Negative.     Cardiovascular: Negative.    Gastrointestinal: Negative.    Endocrine: Negative.    Genitourinary: Negative.    Musculoskeletal: Negative.    Skin: Negative.    Neurological: Negative.    Hematological: Negative.    Psychiatric/Behavioral: Negative.       Physical Exam  Vitals reviewed.   Constitutional:       Appearance: Normal appearance.      Comments: Frequent movement and shifting on the exam chair.   HENT:      Head: Normocephalic and atraumatic.      Nose: Nose normal.      Mouth/Throat:      Mouth: Mucous membranes are moist.      Pharynx: Oropharynx is clear.      Comments: Tongue lesion noted  on left side of tongue. The lesion is the same color as the patient's tongue. No bleeding noted.  Eyes:      Extraocular Movements: Extraocular movements intact.      Conjunctiva/sclera: Conjunctivae normal.      Pupils: Pupils are equal, round, and reactive to light.   Cardiovascular:      Rate and Rhythm: Normal rate and regular rhythm.      Pulses: Normal pulses.      Heart sounds: Normal heart sounds.   Pulmonary:      Effort: Pulmonary effort is normal.      Breath sounds: Normal breath sounds.   Abdominal:      General: Bowel sounds are normal.      Palpations: Abdomen is soft.      Hernia: A hernia (small umbilical  hernia noted on exam.denies pain  with palpation) is present.   Musculoskeletal:         General: Normal range of motion.      Cervical back: Neck supple.   Skin:     General: Skin is warm and dry.   Neurological:      General: No focal deficit present.      Mental Status: He is alert and oriented to person, place, and time. Mental status is at baseline.   Psychiatric:         Mood and Affect: Mood  "normal.         Behavior: Behavior normal.         Thought Content: Thought content normal.         Judgment: Judgment normal.          PAT AIRWAY:   Airway:     Mallampati::  I    TM distance::  >3 FB    Neck ROM::  Full  normal        Visit Vitals  /86   Pulse 88   Temp 37 °C (98.6 °F) (Temporal)   Ht 1.842 m (6' 0.5\")   Wt 94 kg (207 lb 4.8 oz)   SpO2 97%   BMI 27.73 kg/m²   Smoking Status Former   BSA 2.19 m²     ASA: 2  DASI: 58.2  METS: 9.9  CHADS: 1.9%  RCRI: 0.4%  STOP BANG: 3  ARISCAT: 1.6%    Assessment and Plan:     Primary tongue squamous cell carcinoma: Direct Laryngoscopy, Left partial glossectomy.  Anxiety  Right ear tinnitus    CBC and BMP collected in PAT.    Justyna Hong APRN-CNP      "

## 2024-12-12 ENCOUNTER — HOSPITAL ENCOUNTER (INPATIENT)
Facility: HOSPITAL | Age: 74
LOS: 1 days | Discharge: HOME | End: 2024-12-12
Attending: OTOLARYNGOLOGY | Admitting: OTOLARYNGOLOGY
Payer: MEDICARE

## 2024-12-12 ENCOUNTER — ANESTHESIA (OUTPATIENT)
Dept: OPERATING ROOM | Facility: HOSPITAL | Age: 74
End: 2024-12-12
Payer: MEDICARE

## 2024-12-12 VITALS
HEIGHT: 72 IN | HEART RATE: 76 BPM | BODY MASS INDEX: 27.77 KG/M2 | OXYGEN SATURATION: 94 % | WEIGHT: 205.03 LBS | TEMPERATURE: 97.5 F | DIASTOLIC BLOOD PRESSURE: 78 MMHG | SYSTOLIC BLOOD PRESSURE: 139 MMHG | RESPIRATION RATE: 16 BRPM

## 2024-12-12 DIAGNOSIS — C02.9 PRIMARY TONGUE SQUAMOUS CELL CARCINOMA (MULTI): Primary | ICD-10-CM

## 2024-12-12 LAB
ABO GROUP (TYPE) IN BLOOD: NORMAL
ANTIBODY SCREEN: NORMAL
RH FACTOR (ANTIGEN D): NORMAL

## 2024-12-12 PROCEDURE — 7100000009 HC PHASE TWO TIME - INITIAL BASE CHARGE: Performed by: OTOLARYNGOLOGY

## 2024-12-12 PROCEDURE — 7100000010 HC PHASE TWO TIME - EACH INCREMENTAL 1 MINUTE: Performed by: OTOLARYNGOLOGY

## 2024-12-12 PROCEDURE — 0CBM8ZX EXCISION OF PHARYNX, VIA NATURAL OR ARTIFICIAL OPENING ENDOSCOPIC, DIAGNOSTIC: ICD-10-PCS | Performed by: OTOLARYNGOLOGY

## 2024-12-12 PROCEDURE — 2500000004 HC RX 250 GENERAL PHARMACY W/ HCPCS (ALT 636 FOR OP/ED)

## 2024-12-12 PROCEDURE — 2500000005 HC RX 250 GENERAL PHARMACY W/O HCPCS: Performed by: OTOLARYNGOLOGY

## 2024-12-12 PROCEDURE — 3600000009 HC OR TIME - EACH INCREMENTAL 1 MINUTE - PROCEDURE LEVEL FOUR: Performed by: OTOLARYNGOLOGY

## 2024-12-12 PROCEDURE — 2720000007 HC OR 272 NO HCPCS: Performed by: OTOLARYNGOLOGY

## 2024-12-12 PROCEDURE — 31525 DX LARYNGOSCOPY EXCL NB: CPT | Performed by: OTOLARYNGOLOGY

## 2024-12-12 PROCEDURE — 1210000001 HC SEMI-PRIVATE ROOM DAILY

## 2024-12-12 PROCEDURE — 0CB7XZZ EXCISION OF TONGUE, EXTERNAL APPROACH: ICD-10-PCS | Performed by: OTOLARYNGOLOGY

## 2024-12-12 PROCEDURE — 86901 BLOOD TYPING SEROLOGIC RH(D): CPT | Performed by: STUDENT IN AN ORGANIZED HEALTH CARE EDUCATION/TRAINING PROGRAM

## 2024-12-12 PROCEDURE — C1729 CATH, DRAINAGE: HCPCS | Performed by: OTOLARYNGOLOGY

## 2024-12-12 PROCEDURE — 7100000002 HC RECOVERY ROOM TIME - EACH INCREMENTAL 1 MINUTE: Performed by: OTOLARYNGOLOGY

## 2024-12-12 PROCEDURE — A41135: Performed by: STUDENT IN AN ORGANIZED HEALTH CARE EDUCATION/TRAINING PROGRAM

## 2024-12-12 PROCEDURE — 99100 ANES PT EXTEME AGE<1 YR&>70: CPT | Performed by: STUDENT IN AN ORGANIZED HEALTH CARE EDUCATION/TRAINING PROGRAM

## 2024-12-12 PROCEDURE — 3700000001 HC GENERAL ANESTHESIA TIME - INITIAL BASE CHARGE: Performed by: OTOLARYNGOLOGY

## 2024-12-12 PROCEDURE — 3600000004 HC OR TIME - INITIAL BASE CHARGE - PROCEDURE LEVEL FOUR: Performed by: OTOLARYNGOLOGY

## 2024-12-12 PROCEDURE — 3700000002 HC GENERAL ANESTHESIA TIME - EACH INCREMENTAL 1 MINUTE: Performed by: OTOLARYNGOLOGY

## 2024-12-12 PROCEDURE — 41135 TONGUE AND NECK SURGERY: CPT | Performed by: OTOLARYNGOLOGY

## 2024-12-12 PROCEDURE — 7100000001 HC RECOVERY ROOM TIME - INITIAL BASE CHARGE: Performed by: OTOLARYNGOLOGY

## 2024-12-12 PROCEDURE — 36415 COLL VENOUS BLD VENIPUNCTURE: CPT | Performed by: STUDENT IN AN ORGANIZED HEALTH CARE EDUCATION/TRAINING PROGRAM

## 2024-12-12 RX ORDER — TRIPROLIDINE/PSEUDOEPHEDRINE 2.5MG-60MG
400 TABLET ORAL EVERY 6 HOURS
Qty: 800 ML | Refills: 0 | Status: SHIPPED | OUTPATIENT
Start: 2024-12-12 | End: 2024-12-22

## 2024-12-12 RX ORDER — PROPOFOL 10 MG/ML
INJECTION, EMULSION INTRAVENOUS AS NEEDED
Status: DISCONTINUED | OUTPATIENT
Start: 2024-12-12 | End: 2024-12-12

## 2024-12-12 RX ORDER — OXYCODONE HYDROCHLORIDE 5 MG/1
5 TABLET ORAL EVERY 6 HOURS PRN
Qty: 20 TABLET | Refills: 0 | Status: SHIPPED | OUTPATIENT
Start: 2024-12-12

## 2024-12-12 RX ORDER — AMPICILLIN AND SULBACTAM 2; 1 G/1; G/1
INJECTION, POWDER, FOR SOLUTION INTRAMUSCULAR; INTRAVENOUS AS NEEDED
Status: DISCONTINUED | OUTPATIENT
Start: 2024-12-12 | End: 2024-12-12

## 2024-12-12 RX ORDER — ACETAMINOPHEN 325 MG/1
650 TABLET ORAL EVERY 4 HOURS PRN
Status: DISCONTINUED | OUTPATIENT
Start: 2024-12-12 | End: 2024-12-12 | Stop reason: HOSPADM

## 2024-12-12 RX ORDER — MIDAZOLAM HYDROCHLORIDE 1 MG/ML
INJECTION INTRAMUSCULAR; INTRAVENOUS AS NEEDED
Status: DISCONTINUED | OUTPATIENT
Start: 2024-12-12 | End: 2024-12-12

## 2024-12-12 RX ORDER — OXYCODONE HYDROCHLORIDE 5 MG/1
10 TABLET ORAL EVERY 4 HOURS PRN
Status: DISCONTINUED | OUTPATIENT
Start: 2024-12-12 | End: 2024-12-12 | Stop reason: HOSPADM

## 2024-12-12 RX ORDER — HYDROMORPHONE HYDROCHLORIDE 1 MG/ML
INJECTION, SOLUTION INTRAMUSCULAR; INTRAVENOUS; SUBCUTANEOUS AS NEEDED
Status: DISCONTINUED | OUTPATIENT
Start: 2024-12-12 | End: 2024-12-12

## 2024-12-12 RX ORDER — ACETAMINOPHEN 160 MG/5ML
650 LIQUID ORAL EVERY 6 HOURS
Qty: 943 ML | Refills: 1 | Status: SHIPPED | OUTPATIENT
Start: 2024-12-12 | End: 2024-12-22

## 2024-12-12 RX ORDER — LIDOCAINE HCL/PF 100 MG/5ML
SYRINGE (ML) INTRAVENOUS AS NEEDED
Status: DISCONTINUED | OUTPATIENT
Start: 2024-12-12 | End: 2024-12-12

## 2024-12-12 RX ORDER — DROPERIDOL 2.5 MG/ML
0.62 INJECTION, SOLUTION INTRAMUSCULAR; INTRAVENOUS ONCE AS NEEDED
Status: DISCONTINUED | OUTPATIENT
Start: 2024-12-12 | End: 2024-12-12 | Stop reason: HOSPADM

## 2024-12-12 RX ORDER — OXYCODONE HYDROCHLORIDE 5 MG/1
5 TABLET ORAL EVERY 4 HOURS PRN
Status: DISCONTINUED | OUTPATIENT
Start: 2024-12-12 | End: 2024-12-12 | Stop reason: HOSPADM

## 2024-12-12 RX ORDER — DEXMEDETOMIDINE IN 0.9 % NACL 20 MCG/5ML
SYRINGE (ML) INTRAVENOUS AS NEEDED
Status: DISCONTINUED | OUTPATIENT
Start: 2024-12-12 | End: 2024-12-12

## 2024-12-12 RX ORDER — ESMOLOL HYDROCHLORIDE 10 MG/ML
INJECTION INTRAVENOUS AS NEEDED
Status: DISCONTINUED | OUTPATIENT
Start: 2024-12-12 | End: 2024-12-12

## 2024-12-12 RX ORDER — HYDROMORPHONE HYDROCHLORIDE 0.2 MG/ML
0.2 INJECTION INTRAMUSCULAR; INTRAVENOUS; SUBCUTANEOUS EVERY 5 MIN PRN
Status: DISCONTINUED | OUTPATIENT
Start: 2024-12-12 | End: 2024-12-12 | Stop reason: HOSPADM

## 2024-12-12 RX ORDER — SODIUM CHLORIDE 0.9 G/100ML
IRRIGANT IRRIGATION AS NEEDED
Status: DISCONTINUED | OUTPATIENT
Start: 2024-12-12 | End: 2024-12-12 | Stop reason: HOSPADM

## 2024-12-12 RX ORDER — FENTANYL CITRATE 50 UG/ML
INJECTION, SOLUTION INTRAMUSCULAR; INTRAVENOUS AS NEEDED
Status: DISCONTINUED | OUTPATIENT
Start: 2024-12-12 | End: 2024-12-12

## 2024-12-12 RX ORDER — ROCURONIUM BROMIDE 10 MG/ML
INJECTION, SOLUTION INTRAVENOUS AS NEEDED
Status: DISCONTINUED | OUTPATIENT
Start: 2024-12-12 | End: 2024-12-12

## 2024-12-12 RX ORDER — CHLORHEXIDINE GLUCONATE ORAL RINSE 1.2 MG/ML
15 SOLUTION DENTAL
Qty: 1350 ML | Refills: 0 | Status: SHIPPED | OUTPATIENT
Start: 2024-12-12 | End: 2025-01-11

## 2024-12-12 SDOH — HEALTH STABILITY: MENTAL HEALTH: CURRENT SMOKER: 0

## 2024-12-12 ASSESSMENT — PAIN - FUNCTIONAL ASSESSMENT
PAIN_FUNCTIONAL_ASSESSMENT: 0-10

## 2024-12-12 ASSESSMENT — PAIN SCALES - GENERAL
PAINLEVEL_OUTOF10: 5 - MODERATE PAIN
PAINLEVEL_OUTOF10: 0 - NO PAIN
PAINLEVEL_OUTOF10: 2
PAINLEVEL_OUTOF10: 4
PAINLEVEL_OUTOF10: 0 - NO PAIN
PAINLEVEL_OUTOF10: 0 - NO PAIN
PAINLEVEL_OUTOF10: 7
PAIN_LEVEL: 1
PAINLEVEL_OUTOF10: 6

## 2024-12-12 ASSESSMENT — COLUMBIA-SUICIDE SEVERITY RATING SCALE - C-SSRS
1. IN THE PAST MONTH, HAVE YOU WISHED YOU WERE DEAD OR WISHED YOU COULD GO TO SLEEP AND NOT WAKE UP?: NO
2. HAVE YOU ACTUALLY HAD ANY THOUGHTS OF KILLING YOURSELF?: NO
6. HAVE YOU EVER DONE ANYTHING, STARTED TO DO ANYTHING, OR PREPARED TO DO ANYTHING TO END YOUR LIFE?: NO

## 2024-12-12 NOTE — OP NOTE
Direct Laryngoscopy, Glossectomy Partial (L) Operative Note     Date: 2024  OR Location: [facility] OR    Name: Kee Shelley, : 1950, Age: 74 y.o., MRN: [id number], Sex: male    Diagnosis  Pre-op Diagnosis      * Primary tongue squamous cell carcinoma (Multi) [C02.9] Post-op Diagnosis     * Primary tongue squamous cell carcinoma (Multi) [C02.9]     Procedures  Direct Laryngoscopy  90207 - CT LARYNGOSCOPY W/WO TRACHEOSCOPY DX EXCEPT     Glossectomy Partial  28045 - CT GLOSSECTOMY PRTL W/UNI RADICAL NECK DSJ      Surgeons      * Shivam Bauman - Primary    Resident/Fellow/Other Assistant:  Surgeons and Role:     * Karthik Dasilva DO - Resident - Assisting    Staff:   Circulator: Serafin Min Person: Adán  Circulator: Jennifer    Anesthesia Staff: Anesthesiologist: Sean King MD  Anesthesia Resident: Kerri Vicente MD    Procedure Summary  Anesthesia: General  ASA: II  Estimated Blood Loss: 5mL  Intra-op Medications:   Administrations occurring from 0655 to 0900 on 24:   Medication Name Total Dose   sodium chloride 0.9 % irrigation solution 1,000 mL   ampicillin-sulbactam (Unasyn) 3 g 3 g   dexAMETHasone (Decadron) injection 4 mg/mL 10 mg   dexMEDETOMidine 4 mcg/mL in NS syringe 8 mcg   esmolol 10 mg/mL 70 mg   fentaNYL (Sublimaze) injection 50 mcg/mL 100 mcg   HYDROmorphone (Dilaudid) injection 1 mg/mL 0.6 mg   LR bolus Cannot be calculated   lidocaine (cardiac) injection 2% prefilled syringe 100 mg   midazolam PF (Versed) injection 1 mg/mL 2 mg   propofol (Diprivan) injection 10 mg/mL 230 mg   rocuronium (ZeMuron) 50 mg/5 mL injection 50 mg   NaCl 0.9 % bolus Cannot be calculated              Anesthesia Record               Intraprocedure I/O Totals          Intake    LR bolus 250.00 mL    NaCl 0.9 % bolus 250.00 mL    Total Intake 500 mL          Specimen:   ID Type Source Tests Collected by Time   1 : VALLECULA BIOPSY Tissue TONGUE RESECTION SURGICAL PATHOLOGY EXAM Shivam Bauman MD  12/12/2024 0806   2 : LEFT PARTIAL GLOSSECTOMY, STITCH IS ANTERIOR Tissue TONGUE RESECTION SURGICAL PATHOLOGY EXAM Shivam Bauman MD 12/12/2024 0809   3 : ADDITIONAL DEEP MARGIN Tissue TONGUE RESECTION SURGICAL PATHOLOGY EXAM Shivam Bauman MD 12/12/2024 0814   4 : ADDITIONAL LEFT FLOOR OF MOUTH MARGIN, CLIP IS ANTERIOR Tissue TONGUE RESECTION SURGICAL PATHOLOGY EXAM Shivam Bauman MD 12/12/2024 0911   5 : ADDITIONAL LEFT POSTERIOR MEDIAL TONGUE MARGIN, CLIP IS ANTERIOR Tissue TONGUE RESECTION SURGICAL PATHOLOGY EXAM Shivam Bauman MD 12/12/2024 0911                 Drains and/or Catheters: * None in log *    Tourniquet Times:         Implants:     Findings:   Cystic looking lesion suspicious for mucocele in the vallecula  1 cm a 1 cm firm raised lesion on the left lateral tongue with surrounding areas of leukoplakia  Intra-op frozen shows a few margins with high grade dysplasia that was close, these areas were re-excised for permanent.  Tongue closed primarily    Indications: Kee hSelley is an 74 y.o. male who is having surgery for Primary tongue squamous cell carcinoma (Multi) [C02.9].  Previous biopsy confirmed squamous cell carcinoma.  CT imaging identified area of concern consistent with a left lateral tongue.  Recommendation was made to proceed with direct laryngoscopy and partial left colectomy..    The patient was seen in the preoperative area. The risks, benefits, complications, treatment options, non-operative alternatives, expected recovery and outcomes were discussed with the patient. The possibilities of reaction to medication, pulmonary aspiration, injury to surrounding structures, bleeding, recurrent infection, the need for additional procedures, failure to diagnose a condition, and creating a complication requiring transfusion or operation were discussed with the patient. The patient concurred with the proposed plan, giving informed consent.  The site of surgery was properly noted/marked if necessary per policy. The  patient has been actively warmed in preoperative area. Preoperative antibiotics were given prior to surgery    Procedure Details:   After identification in the preoperative holding area the patient was taken to the operating room and transferred to the operating table in supine position. After this anesthesia was induced without difficulty and a proper timeout was performed. The operating table was then rotated 90 degrees counter-clockwise and the patient was appropriately positioned.    The dedo laryngoscope was then introduced and the oropharynx, hypopharynx, soft palate, base of tongue, epiglottis, and larynx were all visualized and there was a cystic mass noted in the vallecula.  Cup forceps were used to take a biopsy and to marsupialize the mucocele. Biopsy was sent for final pathology.    Next, attention was turned to the tongue.  A blue lip retractor and tooth retractor were inserted into the patient's mouth.  A 2-0 silk suture was placed in the anterior portion of the patient's tongue and used as a suspension suture.  The previously biopsied area was identified on the left lateral tongue.  1 cm margins were marked out circumferentially around the lesion, including the additional patches of  leukoplakia laterally.  Monopolar was used to remove the specimen with with care to stay on the outer portions of the previously marked out incision lines and to take a deeper margin at the level of specimen.  The specimen was tagged with 2-0 silk and sent to pathology for frozen analysis.    Monopolar cautery was used to remove another specimen deep to the initial squamous cell carcinoma lesion.  The deep margin was sent for final pathologic analysis.    Frozen analysis confirmed no cancer at the margins.  However, there was concern for areas of high-grade dysplasia that were close on the anterior/inferior and posterior/superior portion of lesions.  The adjacent tissue was discarded and Monopolar cautery and scissors  were used to remove additional margins in these areas.    Bipolar electrocautery was used to obtain hemostasis.  3-0 Vicryl sutures in a horizontal mattress fashion were used to close the deep layes.  A 3-0 Vicryl suture in a running locked fashion was used to close superficial layer.  This concluded procedure.  The bed was rotated back towards anesthesia.  Care was turned over to anesthesia.      Dr. Bauman was present for the critical portions of the procedure.      Complications:  None; patient tolerated the procedure well.    Disposition: PACU - hemodynamically stable.  Condition: stable       Attending Attestation:     Shivam Bauman  Phone Number: [phone]

## 2024-12-12 NOTE — DISCHARGE INSTRUCTIONS
Discharge Instructions        Kee Shelley  [id number]    Physicians:   Dr. Bauman    Operations performed while hospitalized:   Direct laryngoscopy  Left partial glossectomy    Treatment/wound care:   It is okay to shower    Please use your Peridex mouthwash after meals  Please visually inspect your wound(s) at least once daily.  If the wound(s) are in a difficult to see location, please use a mirror or have someone else assist with visual inspection.    Pain Control:    Adequate management: Alternate taking either acetaminophen or ibuprofen every 3 hours as needed for pain. For example, take acetaminophen at 9:00, followed by ibuprofen at 12:00, followed by acetaminophen at 3:00, etc.   Oxycodone can be taken as prescribed as needed for breakthrough pain.    Oxycodone is a narcotic, which can induce constipation.   Please take stool softeners when taking this medication to prevent constipation.    Activity after Discharge:   No heavy lifting, weight bearing as tolerated, no driving until mobility is returned to normal.   Do not submerge wound(s) in standing water for 7 days after surgery (no tub bathing, swimming, or hot tubs).   Do not drive or operate heavy machinery while taking narcotic pain medications as these medications can alter perception, impair judgement, and slow reaction times.    Diet:   You do not have any diet restrictions.  Recommendation is to start with a soft diet and progress back to your regular diet.  Recommendation would be to avoid hot and spicy foods for at least the first week.  These can cause increased pain.    Call Provider if:  Breathing faster than normal  Breathing harder than normal or having retractions  Fever of 100.4 (38 C) or higher  Temperature is greater than 102 degrees  Chills  Drinking less than normal  Not being able to go 4-6 hours between albuterol treatments  Urinating less than normal, over 1 day  Acting very sleepy and difficult to awaken  Vomiting (throwing up) and  not able to eat or drink for 12 hours  3 or more loose, watery bowel movements in 24 hours (diarrhea)  Any new concerning symptoms

## 2024-12-12 NOTE — ANESTHESIA PROCEDURE NOTES
Peripheral IV  Date/Time: 12/12/2024 8:00 AM      Placement  Needle size: 18 G  Laterality: right  Location: hand  Local anesthetic: none  Site prep: chlorhexidine  Technique: anatomical landmarks  Attempts: 1

## 2024-12-12 NOTE — ANESTHESIA POSTPROCEDURE EVALUATION
Patient: Kee Shelley    Procedure Summary       Date: 12/12/24 Room / Location: [facility] OR 05 / [facility]    Anesthesia Start: 0726 Anesthesia Stop: 0937    Procedures:       Direct Laryngoscopy (Mouth)      Glossectomy Partial (Left: Mouth) Diagnosis:       Primary tongue squamous cell carcinoma (Multi)      (Primary tongue squamous cell carcinoma (Multi) [C02.9])    Surgeons: Shivam Bauman MD Responsible Provider: Sean King MD    Anesthesia Type: general ASA Status: 2            Anesthesia Type: general    Vitals Value Taken Time   /73 12/12/24 0940   Temp 36.5 12/12/24 0940   Pulse 70 12/12/24 0937   Resp 16 12/12/24 0937   SpO2 97 % 12/12/24 0937   Vitals shown include unfiled device data.    Anesthesia Post Evaluation    Patient location during evaluation: PACU  Patient participation: complete - patient participated  Level of consciousness: sleepy but conscious  Pain score: 1  Pain management: adequate  Airway patency: patent  Two or more strategies used to mitigate risk of obstructive sleep apnea  Cardiovascular status: acceptable and blood pressure returned to baseline  Respiratory status: acceptable, airway suctioned, face mask and spontaneous ventilation  Hydration status: acceptable  Postoperative Nausea and Vomiting: none        No notable events documented.

## 2024-12-12 NOTE — ANESTHESIA PROCEDURE NOTES
Airway  Date/Time: 12/12/2024 7:39 AM  Urgency: elective    Airway not difficult    Staffing  Performed: resident   Authorized by: Sean King MD    Performed by: Kerri Vicente MD  Patient location during procedure: OR    Indications and Patient Condition  Indications for airway management: anesthesia  Spontaneous Ventilation: absent  Sedation level: deep  Preoxygenated: yes  Patient position: sniffing  Mask difficulty assessment: 1 - vent by mask  Planned trial extubation    Final Airway Details  Final airway type: endotracheal airway      Successful airway: ETT  Cuffed: yes   Successful intubation technique: direct laryngoscopy  Facilitating devices/methods: intubating stylet  Endotracheal tube insertion site: oral  Blade: Tania  Blade size: #3  ETT size (mm): 6.0  Cormack-Lehane Classification: grade I - full view of glottis  Placement verified by: capnometry   Measured from: teeth  ETT to teeth (cm): 21  Number of attempts at approach: 2  Ventilation between attempts: none    Additional Comments  Attempt #1 by MS4 with MAC4 blade, grade III view.    Attempt #2 by PGY-2 with MAC4 blade, grade I view. Patient maintained SpO2 throughout attempts.

## 2024-12-12 NOTE — ANESTHESIA PREPROCEDURE EVALUATION
"Patient: Kee Shelley    Procedure Information       Date/Time: 12/12/24 0655    Procedures:       Direct Laryngoscopy      Glossectomy Partial (Left) - Left partial glossectomy    Location: [facility] OR 05 / [facility]    Surgeons: Shivam Bauman MD        74 yoM w/ L sided tongue lesion presenting for partial glossectomy.     ALLERGIES:  No Known Allergies     MEDICAL HISTORY:  Past Medical History:   Diagnosis Date    Anxiety     Tinnitus of right ear         Relevant Problems   Liver   (+) Primary tongue squamous cell carcinoma (Multi)        SURGICAL HISTORY:  Past Surgical History:   Procedure Laterality Date    CARPAL TUNNEL RELEASE Left     CATARACT EXTRACTION Bilateral     COLONOSCOPY      OTHER SURGICAL HISTORY Bilateral     bilateral  retinal tear repair        MEDICATIONS:  Current Outpatient Medications   Medication Instructions    acetaminophen (TYLENOL) 1,000 mg, Every 6 hours PRN    calcium citrate 250 mg calcium tablet Daily    magnesium 250 mg tablet Daily    multivit-min/iron/folic acid/K (ADULTS MULTIVITAMIN ORAL) Take 1 tablet by mouth once daily.    vitamins A,C,E-zinc-copper (PreserVision AREDS) 4,296 mcg-226 mg-90 mg capsule Take 1 capsule by mouth once daily.    ZINC ACETATE ORAL Daily        VITALS:      12/12/2024     6:17 AM 12/11/2024     2:55 PM 11/25/2024     1:52 PM   Vitals   Systolic 147 145 149   Diastolic 88 86 94   Heart Rate 79 88 79   Temp 36.1 °C (97 °F) 37 °C (98.6 °F) 36.8 °C (98.3 °F)   Resp 18  16   Height 1.829 m (6') 1.842 m (6' 0.5\") 1.829 m (6')   Weight (lb) 205.03 207.3 205.25   BMI 27.81 kg/m2 27.73 kg/m2 27.84 kg/m2   BSA (m2) 2.17 m2 2.19 m2 2.17 m2   Visit Report   Report       LABS:   BMP   Lab Results   Component Value Date    GLUCOSE 81 12/11/2024    CALCIUM 9.4 12/11/2024     12/11/2024    K 4.1 12/11/2024    CO2 27 12/11/2024     12/11/2024    BUN 14 12/11/2024    CREATININE 0.85 12/11/2024   , CBC  Lab Results   Component Value Date    " WBC 8.5 2024    HGB 16.4 2024    HCT 49.2 2024    MCV 90 2024     2024           SOCIAL:  Social History     Tobacco Use   Smoking Status Former    Current packs/day: 0.00    Types: Cigarettes    Quit date: 1974    Years since quittin.0   Smokeless Tobacco Former    Types: Chew   Tobacco Comments    Former chew on occasion      Social History     Substance and Sexual Activity   Alcohol Use Yes    Comment: about once a month      Social History     Substance and Sexual Activity   Drug Use Never        NPO STATUS:  NPO/Void Status  Carbohydrate Drink Given Prior to Surgery? : N  Date of Last Liquid: 24  Time of Last Liquid: 0  Date of Last Solid: 24  Time of Last Solid: 1700  Last Intake Type: Clear fluids        Clinical Areas Reviewed:   Tobacco  Allergies  Meds   Med Hx  Surg Hx   Fam Hx  Soc Hx        Anesthesia Assessment:    Physical Exam    Airway  Mallampati: I  TM distance: >3 FB  Neck ROM: full     Cardiovascular    Dental    Pulmonary    Abdominal            Anesthesia Plan    History of general anesthesia?: yes  History of complications of general anesthesia?: no    ASA 2     general     The patient is not a current smoker.    intravenous induction   Postoperative administration of opioids is intended.  Trial extubation is planned.  Anesthetic plan and risks discussed with patient.  Use of blood products discussed with patient who consented to blood products.    Plan discussed with resident.

## 2024-12-27 LAB
LAB AP BLOCK FOR ADDITIONAL STUDIES: NORMAL
LABORATORY COMMENT REPORT: NORMAL
Lab: NORMAL
PATH REPORT.FINAL DX SPEC: NORMAL
PATH REPORT.GROSS SPEC: NORMAL
PATH REPORT.RELEVANT HX SPEC: NORMAL
PATH REPORT.TOTAL CANCER: NORMAL
PATHOLOGY SYNOPTIC REPORT: NORMAL
RESIDENT REVIEW: NORMAL

## 2024-12-28 DIAGNOSIS — C02.3 MALIGNANT NEOPLASM OF ANTERIOR TWO-THIRDS OF TONGUE (MULTI): Primary | ICD-10-CM

## 2025-01-03 ENCOUNTER — APPOINTMENT (OUTPATIENT)
Dept: HEMATOLOGY/ONCOLOGY | Facility: HOSPITAL | Age: 75
End: 2025-01-03
Payer: COMMERCIAL

## 2025-01-13 ENCOUNTER — OFFICE VISIT (OUTPATIENT)
Dept: OTOLARYNGOLOGY | Facility: CLINIC | Age: 75
End: 2025-01-13
Payer: MEDICARE

## 2025-01-13 VITALS
RESPIRATION RATE: 16 BRPM | TEMPERATURE: 98.7 F | OXYGEN SATURATION: 99 % | BODY MASS INDEX: 28.31 KG/M2 | HEART RATE: 87 BPM | WEIGHT: 209 LBS | HEIGHT: 72 IN

## 2025-01-13 DIAGNOSIS — C02.3 MALIGNANT NEOPLASM OF ANTERIOR TWO-THIRDS OF TONGUE (MULTI): Primary | ICD-10-CM

## 2025-01-13 PROCEDURE — 1159F MED LIST DOCD IN RCRD: CPT | Performed by: OTOLARYNGOLOGY

## 2025-01-13 PROCEDURE — 99211 OFF/OP EST MAY X REQ PHY/QHP: CPT | Performed by: OTOLARYNGOLOGY

## 2025-01-13 PROCEDURE — 1036F TOBACCO NON-USER: CPT | Performed by: OTOLARYNGOLOGY

## 2025-01-13 PROCEDURE — 1126F AMNT PAIN NOTED NONE PRSNT: CPT | Performed by: OTOLARYNGOLOGY

## 2025-01-13 PROCEDURE — 3008F BODY MASS INDEX DOCD: CPT | Performed by: OTOLARYNGOLOGY

## 2025-01-13 ASSESSMENT — PATIENT HEALTH QUESTIONNAIRE - PHQ9
SUM OF ALL RESPONSES TO PHQ9 QUESTIONS 1 AND 2: 0
2. FEELING DOWN, DEPRESSED OR HOPELESS: NOT AT ALL
1. LITTLE INTEREST OR PLEASURE IN DOING THINGS: NOT AT ALL

## 2025-01-13 ASSESSMENT — COLUMBIA-SUICIDE SEVERITY RATING SCALE - C-SSRS
2. HAVE YOU ACTUALLY HAD ANY THOUGHTS OF KILLING YOURSELF?: NO
1. IN THE PAST MONTH, HAVE YOU WISHED YOU WERE DEAD OR WISHED YOU COULD GO TO SLEEP AND NOT WAKE UP?: NO
6. HAVE YOU EVER DONE ANYTHING, STARTED TO DO ANYTHING, OR PREPARED TO DO ANYTHING TO END YOUR LIFE?: NO

## 2025-01-13 ASSESSMENT — PAIN SCALES - GENERAL: PAINLEVEL_OUTOF10: 0-NO PAIN

## 2025-01-13 ASSESSMENT — ENCOUNTER SYMPTOMS: OCCASIONAL FEELINGS OF UNSTEADINESS: 0

## 2025-01-13 NOTE — PROGRESS NOTES
Otolaryngology - Head and Neck Cancer Outpatient Follow-up Note    Chief Concern:  Follow-up for Tongue lesion     History Of Present Illness  Kee Shelley is a 74 y.o. male with a history of  a left posterior-lateral tongue SCC referred by Dr. Mcqueen, presenting today for further evaluation.  Patient reports that they had left-sided tongue pain about 2 and half months ago.  This was persistent and progressive and so they presented to PCP for further evaluation.  Their PCP noticed a lesion on the lateral aspect the tongue and sent them to Dr. Mcqueen for further evaluation.  Dr. Mcqueen elevated lesion in obtain a biopsy which resulted with schedule carcinoma.  He is not have any significant difficulty with p.o. intake, breathing, bleeding, denies any new neck masses.  He has a remote history of smoking 50 years ago, for half a pack a day for 4 to 5 years.  He is an occasional drinker.  He has a family history of colorectal cancer in his sister.  No other family history of head neck malignancies.  He is retired.  He is pretty active and works out 4 to 5 days a week with cardio and weightlifting.  He is right-handed.     Previous Head and Neck Oncologic History:  Diagnosis:  Tongue lesion  Initial Staging: n/a   Treatment History:   10/29/24 - Tongue lesion bx: +SCC    1-13-25 post-op: s/p left partial glossectomy. He is doing well, no longer having any pain. No bleeding    Past Medical History  He has a past medical history of Anxiety and Tinnitus of right ear.  Patient Active Problem List   Diagnosis    Tinnitus of right ear    Primary tongue squamous cell carcinoma (Multi)       Surgical History  He has a past surgical history that includes Carpal tunnel release (Left); Cataract extraction (Bilateral); Colonoscopy; and Other surgical history (Bilateral).     Social History  He reports that he quit smoking about 50 years ago. His smoking use included cigarettes. He has quit using smokeless tobacco.  His smokeless  tobacco use included chew. He reports current alcohol use. He reports that he does not use drugs.    Family History  No family history on file.     Allergies  Patient has no known allergies.    Review of Systems  A 12-point review of systems was performed and noted be negative except for that which was mentioned in the history of present illness     Last Recorded Vitals  Pulse 87, temperature 37.1 °C (98.7 °F), temperature source Temporal, resp. rate 16, height 1.829 m (6'), weight 94.8 kg (209 lb), SpO2 99%.     Physical Exam:  NAD alert  Rebecca eomi NCAT  Oc/op: healing lateral tongue incision, still some mild swelling posteriorly, there may have been a dehiscent suture  Neck without masses or lesions      Medications:  Medication Documentation Review Audit       Reviewed by Edelmira Cadet RN (Registered Nurse) on 25 at 1357      Medication Order Taking? Sig Documenting Provider Last Dose Status   calcium citrate 250 mg calcium tablet [id number] Yes Take by mouth once daily. Madhu Marcos MD 2024 Active   chlorhexidine (Peridex) 0.12 % solution [id number]  Use 15 mL in the mouth or throat 3 times a day after meals. Shivam Bauman MD   25 2359   magnesium 250 mg tablet [id number] Yes Take by mouth once daily. Madhu Marcos MD 2024 Active   multivit-min/iron/folic acid/K (ADULTS MULTIVITAMIN ORAL) [id number] Yes Take 1 tablet by mouth once daily. Madhu Marcos MD 2024 Active   oxyCODONE (Roxicodone) 5 mg immediate release tablet [id number] Yes Take 1 tablet (5 mg) by mouth every 6 hours if needed for severe pain (7 - 10). Chauncey Monte MD  Active   vitamins A,C,E-zinc-copper (PreserVision AREDS) 4,296 mcg-226 mg-90 mg capsule [id number] Yes Take 1 capsule by mouth once daily. Madhu Marcos MD 2024 Active   ZINC ACETATE ORAL [id number] Yes Use in the mouth or throat once daily. Madhu Marcos MD 2024 Active                     Assessment/Plan    74-year-old male with a left tongue cancer    Reviewed final path - only a 0.7 mm scca with 2mm DOI. There is some CIS at the anterior margin, this was likely re-excised    -pt healing as expected  -I will see him back in 2 months for follow up

## 2025-01-16 NOTE — TUMOR BOARD NOTE
[facility] HEAD AND NECK TUMOR BOARD NOTE:    Kee Shelley Is a 74 y.o. male who was presented by Dr. Bauman at [facility] Neck Tumor Board on 1/17/2025 which included representatives from all Head & Neck disciplines (Medical oncology/Radiation oncology/Otolaryngology/Radiology/Pathology).     History and Physical in Brief:  74-year-old male with no significant past medical history and very remote history of smoking who initially presented to Dr. Mcqueen in late October 2024 for left posterolateral tongue pain which was biopsied in office and demonstrated squamous cell carcinoma.  Patient was subsequently scheduled with Dr. Bauman and saw him 11/25.    Imaging:  CT Neck with Contrast (12/5/2024):   1.5 cm lesion within the left lateral tongue, no suspicious lymphadenopathy    Procedures to date:  12/12/2024-direct laryngoscopy, left partial glossectomy    Pertinent Pathology:  12/12/2024  -Vallecula biopsy: Squamous mucosa and lymphoid tissue  -Tongue, left, partial glossectomy: Invasive keratinizing moderately differentiated squamous cell carcinoma 0.7 cm in size, with 2 mm depth of invasion, negative margins  -There is noted to be severe dysplasia at the anterior portion of the floor of mouth mucosal margin, additional margins sent were negative for high grade dysplasia and carcinoma     The [facility] Head and Neck Tumor Board considered available treatment options and made the following staging and recommendations:    Staging and Recommendations:    Site: left Oral Cavity squamous cell carcinoma  Stage: T1N0M0  Recommendation: Observation    Clinical Trial Status:   N/A      National site-specific guidelines were discussed with respect to the case.

## 2025-01-17 ENCOUNTER — TUMOR BOARD CONFERENCE (OUTPATIENT)
Dept: HEMATOLOGY/ONCOLOGY | Facility: HOSPITAL | Age: 75
End: 2025-01-17
Payer: COMMERCIAL

## 2025-03-10 ENCOUNTER — OFFICE VISIT (OUTPATIENT)
Dept: OTOLARYNGOLOGY | Facility: CLINIC | Age: 75
End: 2025-03-10
Payer: MEDICARE

## 2025-03-10 VITALS
HEIGHT: 72 IN | BODY MASS INDEX: 28.44 KG/M2 | WEIGHT: 210 LBS | DIASTOLIC BLOOD PRESSURE: 91 MMHG | TEMPERATURE: 98.8 F | OXYGEN SATURATION: 18 % | HEART RATE: 55 BPM | RESPIRATION RATE: 18 BRPM | SYSTOLIC BLOOD PRESSURE: 140 MMHG

## 2025-03-10 DIAGNOSIS — C02.9 TONGUE CANCER (MULTI): Primary | ICD-10-CM

## 2025-03-10 PROCEDURE — 99211 OFF/OP EST MAY X REQ PHY/QHP: CPT | Performed by: OTOLARYNGOLOGY

## 2025-03-10 PROCEDURE — 1160F RVW MEDS BY RX/DR IN RCRD: CPT | Performed by: OTOLARYNGOLOGY

## 2025-03-10 PROCEDURE — 1159F MED LIST DOCD IN RCRD: CPT | Performed by: OTOLARYNGOLOGY

## 2025-03-10 PROCEDURE — 1126F AMNT PAIN NOTED NONE PRSNT: CPT | Performed by: OTOLARYNGOLOGY

## 2025-03-10 PROCEDURE — 1036F TOBACCO NON-USER: CPT | Performed by: OTOLARYNGOLOGY

## 2025-03-10 ASSESSMENT — ENCOUNTER SYMPTOMS
LOSS OF SENSATION IN FEET: 0
OCCASIONAL FEELINGS OF UNSTEADINESS: 1
DEPRESSION: 0

## 2025-03-10 ASSESSMENT — PAIN SCALES - GENERAL: PAINLEVEL_OUTOF10: 0-NO PAIN

## 2025-03-10 NOTE — PROGRESS NOTES
Otolaryngology - Head and Neck Cancer Outpatient Follow-up Note    Chief Concern:  Follow-up for Tongue lesion     History Of Present Illness  Kee Shelley is a 75 y.o. male with a history of  a left posterior-lateral tongue SCC referred by Dr. Mcqueen, presenting today for further evaluation.  Patient reports that they had left-sided tongue pain about 2 and half months ago.  This was persistent and progressive and so they presented to PCP for further evaluation.  Their PCP noticed a lesion on the lateral aspect the tongue and sent them to Dr. Mcqueen for further evaluation.  Dr. Mcqueen elevated lesion in obtain a biopsy which resulted with schedule carcinoma.  He is not have any significant difficulty with p.o. intake, breathing, bleeding, denies any new neck masses.  He has a remote history of smoking 50 years ago, for half a pack a day for 4 to 5 years.  He is an occasional drinker.  He has a family history of colorectal cancer in his sister.  No other family history of head neck malignancies.  He is retired.  He is pretty active and works out 4 to 5 days a week with cardio and weightlifting.  He is right-handed.     Previous Head and Neck Oncologic History:  Diagnosis:  Tongue lesion  Initial Staging: n/a   Treatment History:   10/29/24 - Tongue lesion bx: +SCC    1-13-25 post-op: s/p left partial glossectomy. He is doing well, no longer having any pain. No bleeding  3-10-25 FU: no complaints    Past Medical History  He has a past medical history of Anxiety and Tinnitus of right ear.  Patient Active Problem List   Diagnosis    Tinnitus of right ear    Primary tongue squamous cell carcinoma (Multi)       Surgical History  He has a past surgical history that includes Carpal tunnel release (Left); Cataract extraction (Bilateral); Colonoscopy; and Other surgical history (Bilateral).     Social History  He reports that he quit smoking about 50 years ago. His smoking use included cigarettes. He has quit using smokeless  tobacco.  His smokeless tobacco use included chew. He reports current alcohol use. He reports that he does not use drugs.    Family History  No family history on file.     Allergies  Patient has no known allergies.    Review of Systems  A 12-point review of systems was performed and noted be negative except for that which was mentioned in the history of present illness     Last Recorded Vitals  Blood pressure (!) 140/91, pulse 55, temperature 37.1 °C (98.8 °F), resp. rate 18, height 1.829 m (6'), weight 95.3 kg (210 lb), SpO2 (!) 18%, peak flow 96 L/min.     Physical Exam:  NAD alert  Rebecca eomi NCAT  Bl eac and Tms clear  Oc/op: healing lateral tongue incision, prior swelling resolved  No concerning lesions  Neck without masses or lesions    Medications:  Medication Documentation Review Audit       Reviewed by Shivam Bauman MD (Physician) on 03/10/25 at 1434      Medication Order Taking? Sig Documenting Provider Last Dose Status   calcium citrate 250 mg calcium tablet [id number] Yes Take by mouth once daily. Madhu Marcos MD 12/11/2024 Active   magnesium 250 mg tablet [id number] Yes Take by mouth once daily. Madhu Marcos MD 12/11/2024 Active   multivit-min/iron/folic acid/K (ADULTS MULTIVITAMIN ORAL) [id number] Yes Take 1 tablet by mouth once daily. Historical Provider, MD 12/11/2024 Active   oxyCODONE (Roxicodone) 5 mg immediate release tablet [id number]  Take 1 tablet (5 mg) by mouth every 6 hours if needed for severe pain (7 - 10).   Patient not taking: Reported on 3/10/2025    Chauncey Monte MD  Active   vitamins A,C,E-zinc-copper (PreserVision AREDS) 4,296 mcg-226 mg-90 mg capsule [id number] Yes Take 1 capsule by mouth once daily. Madhu Marcos MD 12/11/2024 Active   ZINC ACETATE ORAL [id number] Yes Use in the mouth or throat once daily. Madhu Provider, MD 12/11/2024 Active                   Assessment/Plan   74-year-old male with a left tongue cancer    final path - only a 0.7 mm scca with  2mm DOI. There is some CIS at the anterior margin, this was likely re-excised    -well healed  , DIAN  -follow up with Dr. Mejía in 3 months

## 2025-06-16 ENCOUNTER — APPOINTMENT (OUTPATIENT)
Dept: OTOLARYNGOLOGY | Facility: CLINIC | Age: 75
End: 2025-06-16
Payer: COMMERCIAL

## 2025-07-21 ENCOUNTER — APPOINTMENT (OUTPATIENT)
Dept: OTOLARYNGOLOGY | Facility: CLINIC | Age: 75
End: 2025-07-21
Payer: MEDICARE

## 2025-07-21 VITALS — WEIGHT: 210 LBS | BODY MASS INDEX: 28.48 KG/M2

## 2025-07-21 DIAGNOSIS — C02.3 MALIGNANT NEOPLASM OF ANTERIOR TWO-THIRDS OF TONGUE (MULTI): Primary | ICD-10-CM

## 2025-07-21 PROCEDURE — 1159F MED LIST DOCD IN RCRD: CPT | Performed by: STUDENT IN AN ORGANIZED HEALTH CARE EDUCATION/TRAINING PROGRAM

## 2025-07-21 PROCEDURE — 99214 OFFICE O/P EST MOD 30 MIN: CPT | Performed by: STUDENT IN AN ORGANIZED HEALTH CARE EDUCATION/TRAINING PROGRAM

## 2025-07-21 NOTE — PROGRESS NOTES
HEAD AND NECK SURGERY CONSULT  [facility]    Referring Provider: Dr. Bauman    HPI  I had the pleasure of seeing Kee Shelley as a consultation today for evaluation of prior lateral tongue SCC.    Treatment History  - 10/29/24 biopsy of tongue lesion, invasive SCC  - 12/5/24 CT neck without any enlarged lymph nodes, left lateral tongue lesion visualized  - 12/12/24 left partial glossectomy (Li), final pathology 0.7 cm, DOI 2 mm, CIS at anterior margin but all tumor bed margins negative pT1     Last seen by Dr Bauman 3/10/25, at that time he was doing well. Returns today for follow up visit. No pain, bleeding, or mass at the site of prior surgery. Some discomfort, taste and nerve changes.     Tobacco use: The patient  reports that he quit smoking about 50 years ago. His smoking use included cigarettes. He has never been exposed to tobacco smoke. He has quit using smokeless tobacco.  His smokeless tobacco use included chew.   Alcohol Use: The patient  reports current alcohol use.     Physical Examination  Vitals:  Wt 95.3 kg (210 lb)   BMI 28.48 kg/m²   General: Examination reveals a well-developed, well-nourished patient in no apparent distress.  The patient has no audible dysphonia, stridor or airway distress.  The patient is oriented, alert and responsive.    Ears: Bilateral EAC patent, TM visualized and intact, no middle ear effusion   Face: no lesions of the face, symmetric movement  Oral Cavity:  The patient is able to open the mouth widely without trismus.  The floor of mouth and oral tongue are soft, and no mucosal abnormalities are noted on the lips or within the oral cavity.  Small amount of scar tissue left lateral tongue, good mobility with no tethering, no mass or lesion, no tenderness to palpation  Oropharynx: There are no mucosal abnormalities noted within the oropharynx.  The soft palate elevates symmetrically, the tonsils and base of tongue are normal to inspection and palpation.        Salivary glands: There are no palpable masses of the parotid or submandibular glands.   Neuro: Cranial nerves 2-12 are without obvious abnormality.  Neck: The neck is soft and symmetric.  There is no palpable adenopathy.     DATA REVIEWED:  Labs:  Lab Results   Component Value Date    WBC 8.5 12/11/2024    HGB 16.4 12/11/2024    HCT 49.2 12/11/2024     12/11/2024    NEUTROABS 5.52 (H) 12/11/2024     Lab Results   Component Value Date    HGBA1C 5.7 (H) 02/14/2024        Radiology: I personally reviewed the CT neck from 12/5/24 without any enlarged lymph nodes, left lateral tongue lesion visualized    Review of prior medical records: I reviewed the patient's medical records which included a clinic note from Dr. Bauman from 3/10/25 detailing patient's surgical history and surveillance post operatively.     Pathology: I reviewed the pathology report from the surgery 12/12/24 showing 0.7 cm SCC with DOI 2 mm     ASSESSMENT and PLAN:    pT1 SCC left lateral tongue, now s/p surgical resection 12/12/24  - doing very well, DIAN now over 6 months out from treatment  - Reassured him that symptoms he is experiencing are within the realm of what is to be expected, red flag symptoms discussed  - reviewed NCCN guidelines for continued surveillance  - Follow up 3 months, certainly sooner if any issues  Julietaramis Mejía MD

## 2025-10-20 ENCOUNTER — APPOINTMENT (OUTPATIENT)
Dept: OTOLARYNGOLOGY | Facility: CLINIC | Age: 75
End: 2025-10-20
Payer: MEDICARE

## (undated) DEVICE — CLIP, LIGATING, W/ADHESIVE PAD, MEDIUM, TITANIUM

## (undated) DEVICE — VALVE, DEFENDO, 5 PCS BUTTON SET, SINGLE USE

## (undated) DEVICE — SUTURE, PLAIN, 5-0, 18 IN, PC1, YELLOW

## (undated) DEVICE — VALVE, SUCTION

## (undated) DEVICE — Device

## (undated) DEVICE — CLIP, LIGATING, W/ADHESIVE, WIDE SLOT, SMALL, TITANIUM

## (undated) DEVICE — BOWL, BASIN, 32 OZ, STERILE

## (undated) DEVICE — TUBING, SUCTION, CONNECTING, STERILE 0.25 X 120 IN., LF

## (undated) DEVICE — CATHETER, URETHRAL, PEZZER, 3 CM HEAD, 36 FR, LATEX

## (undated) DEVICE — NEEDLE, ELECTRODE, ELECTROSURGICAL, INSULATED

## (undated) DEVICE — SUTURE, SILK, 2-0, 30 IN, SH, BLACK

## (undated) DEVICE — ADAPTER, WATER BOTTLE, SMART CAP, 140/160/180 SERIES

## (undated) DEVICE — DRESSING, GAUZE, 16 PLY, 4 X 4 IN, STERILE

## (undated) DEVICE — VALVE, BIOPSY, BRONCHOSCOPE, DISPOSABLE, STERILE

## (undated) DEVICE — COVER, CART, 45 X 27 X 48 IN, CLEAR

## (undated) DEVICE — REST, HEAD, BAGEL, 9 IN

## (undated) DEVICE — MANIFOLD, 4 PORT NEPTUNE STANDARD

## (undated) DEVICE — SUTURE, CHROMIC, 4-0, 18 IN, P12, BROWN